# Patient Record
Sex: FEMALE | Race: WHITE | NOT HISPANIC OR LATINO
[De-identification: names, ages, dates, MRNs, and addresses within clinical notes are randomized per-mention and may not be internally consistent; named-entity substitution may affect disease eponyms.]

---
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
## 2020-12-07 PROBLEM — F41.1 GAD (GENERALIZED ANXIETY DISORDER): Status: ACTIVE | Noted: 2020-12-07

## 2020-12-07 PROBLEM — F50.01 ANOREXIA NERVOSA, RESTRICTING TYPE: Status: ACTIVE | Noted: 2020-12-07

## 2020-12-07 PROBLEM — F50.01 ANOREXIA NERVOSA, RESTRICTING TYPE (HCC): Status: ACTIVE | Noted: 2020-12-07

## 2020-12-07 PROBLEM — F50.019 ANOREXIA NERVOSA, RESTRICTING TYPE: Status: ACTIVE | Noted: 2020-12-07

## 2023-09-26 ENCOUNTER — HOSPITAL ENCOUNTER (OUTPATIENT)
Dept: MRI IMAGING | Age: 23
Discharge: HOME | End: 2023-09-26
Attending: NURSE PRACTITIONER

## 2023-09-26 ENCOUNTER — APPOINTMENT (OUTPATIENT)
Dept: MRI IMAGING | Age: 23
End: 2023-09-26
Attending: NURSE PRACTITIONER

## 2023-09-26 DIAGNOSIS — R51.9 HEADACHE: Primary | ICD-10-CM

## 2023-09-26 DIAGNOSIS — R20.2 PARESTHESIA: ICD-10-CM

## 2023-09-26 DIAGNOSIS — R51.9 HEADACHE: ICD-10-CM

## 2023-09-26 PROCEDURE — 10002805 HB CONTRAST AGENT: Performed by: NURSE PRACTITIONER

## 2023-09-26 PROCEDURE — A9585 GADOBUTROL INJECTION: HCPCS | Performed by: NURSE PRACTITIONER

## 2023-09-26 PROCEDURE — G1004 CDSM NDSC: HCPCS

## 2023-09-26 PROCEDURE — 70553 MRI BRAIN STEM W/O & W/DYE: CPT

## 2023-09-26 RX ORDER — GADOBUTROL 604.72 MG/ML
6 INJECTION INTRAVENOUS ONCE
Status: COMPLETED | OUTPATIENT
Start: 2023-09-26 | End: 2023-09-26

## 2023-09-26 RX ADMIN — GADOBUTROL 6 ML: 604.72 INJECTION INTRAVENOUS at 18:09

## 2024-01-16 ENCOUNTER — LAB REQUISITION (OUTPATIENT)
Dept: LAB | Age: 24
End: 2024-01-16

## 2024-01-16 DIAGNOSIS — Z01.419 ENCOUNTER FOR GYNECOLOGICAL EXAMINATION (GENERAL) (ROUTINE) WITHOUT ABNORMAL FINDINGS: ICD-10-CM

## 2024-01-16 PROCEDURE — 87591 N.GONORRHOEAE DNA AMP PROB: CPT | Performed by: CLINICAL MEDICAL LABORATORY

## 2024-01-16 PROCEDURE — 88175 CYTOPATH C/V AUTO FLUID REDO: CPT | Performed by: CLINICAL MEDICAL LABORATORY

## 2024-01-16 PROCEDURE — 87491 CHLMYD TRACH DNA AMP PROBE: CPT | Performed by: CLINICAL MEDICAL LABORATORY

## 2024-01-17 LAB
C TRACH RRNA SPEC QL NAA+PROBE: NEGATIVE
Lab: NORMAL
N GONORRHOEA RRNA SPEC QL NAA+PROBE: NEGATIVE

## 2024-01-18 LAB — HOLD SPECIMEN: NORMAL

## 2024-01-20 LAB
CASE RPRT: NORMAL
CLINICAL INFO: NORMAL
CYTOLOGY CVX/VAG STUDY: NORMAL
PAP EDUCATIONAL NOTE: NORMAL
SPECIMEN ADEQUACY: NORMAL

## 2024-07-17 ENCOUNTER — OFFICE VISIT (OUTPATIENT)
Dept: ENDOCRINOLOGY CLINIC | Facility: CLINIC | Age: 24
End: 2024-07-17

## 2024-07-17 ENCOUNTER — TELEPHONE (OUTPATIENT)
Dept: ENDOCRINOLOGY CLINIC | Facility: CLINIC | Age: 24
End: 2024-07-17

## 2024-07-17 VITALS
HEART RATE: 71 BPM | SYSTOLIC BLOOD PRESSURE: 90 MMHG | DIASTOLIC BLOOD PRESSURE: 52 MMHG | BODY MASS INDEX: 17.86 KG/M2 | HEIGHT: 64 IN | WEIGHT: 104.63 LBS

## 2024-07-17 DIAGNOSIS — M85.80 OSTEOPENIA, UNSPECIFIED LOCATION: Primary | ICD-10-CM

## 2024-07-17 NOTE — TELEPHONE ENCOUNTER
Received fax from [facility] internal medicine attached is pt medical records, pt has upcoming appt 07/17/24 records placed in provider folder for review

## 2024-07-17 NOTE — H&P
New Patient Evaluation - History and Physical    CONSULT - Reason for Visit:  Decreased BMD.  Requesting Physician: Dr. Natalia Vazquez.    CHIEF COMPLAINT:    Chief Complaint   Patient presents with    Consult     Osteopenia, last labs 7/2024        HISTORY OF PRESENT ILLNESS:   Huma Land is a 24 year old female who presents with decreased BMD.     Eating disorder since she was 13. This worsened in college.     FRACTURE HISTORY  o Low trauma, atraumatic or high trauma (specifics regarding circumstances of fracture)  None  o Fall-related fracture and circumstances of fall    o Prior history of fracture(s)     o Site, age at fracture, interventions    o Prior history of osteoporosis    o Prior history of osteoporosis treatment (medication, age or date used, duration of use, pre or postmenopausal ,when used, and reason for discontinuation) None    o Prolonged history of steroids, aromatase inhibitors, anticonvulsants, and other meds that may affect skeleton    o Chronic use vs. intermittent use with dates of usage    o Secondary conditions associated with osteoporosis    o DXA tests in past ( age or date when performed and results)   Reviewed    REPRODUCTIVE HISTORY      o Menarche age     o Regular/Irregular menses   Regular cycles, did not require birth control pills    PAST MEDICAL HISTORY:   No past medical history on file.      SOCIAL HISTORY    o Occupation     o Country of origin     o Tobacco    o Alcohol    o Daily calcium intake (food and supplements)  She takes vitamin D and calcium supplements  o Daily exercise  She exercises well  FAMILY HISTORY   Family History   Problem Relation Age of Onset    High Cholesterol Father     High Blood Pressure Father     Mental Disorder Father         anxiety    Anxiety Father         Takes Effexor and xanax PRN    Depression Father     Cancer Maternal Grandmother     High Cholesterol Maternal Grandmother     High Blood Pressure Maternal Grandfather     High  Cholesterol Maternal Grandfather     Other (hypothyroidism) Mother     Alcohol and Other Disorders Associated Neg     Substance Abuse Neg      Family history of fractures, osteoporosis, osteopenia      CURRENT MEDICATIONS:    Current Outpatient Medications   Medication Sig Dispense Refill    desvenlafaxine ER (PRISTIQ) 100 MG Oral Tablet 24 Hr Take 1 tablet (100 mg total) by mouth daily with breakfast. PLEASE CALL OFFICE FOR APPT. No future refills until appt is made 30 tablet 0    clonazePAM 0.5 MG Oral Tab Take a half tablet twice daily as needed for anxiety 45 tablet 2    hydrOXYzine 10 MG Oral Tab Take one to two tablets three times daily as needed. 60 tablet 0       ALLERGIES:  Allergies   Allergen Reactions    Amoclan NAUSEA AND VOMITING    Augmentin [Amoxicillin-Pot Clavulanate] NAUSEA AND VOMITING         ASSESSMENTS:   REVIEW OF SYSTEMS:  No falls in past 12 months, no loss of height   Constitutional: Negative for: weight change, fever, fatigue, cold/heat intolerance  Eyes: Negative for:  Visual changes, proptosis, blurring  ENT: Negative for:  dysphagia, neck swelling, dysphonia  Respiratory: Negative for:  dyspnea, cough  Cardiovascular: Negative for:  chest pain, palpitations, orthopnea  GI: Negative for:  abdominal pain, nausea, vomiting, diarrhea, constipation, bleeding  Neurology: Negative for: headache, numbness, weakness  Genito-Urinary: Negative for: dysuria, frequency  Psychiatric: Negative for:  depression, anxiety  Hematology/Lymphatics: Negative for: bruising, lower extremity edema  Endocrine: Negative for: polyuria, polydypsia  Skin: Negative for: rash, blister, cellulitis,      PHYSICAL EXAM:   Vitals:    07/17/24 1716   BP: 90/52   Pulse: 71   Weight: 104 lb 9.6 oz (47.4 kg)   Height: 5' 4\" (1.626 m)     BMI: Body mass index is 17.95 kg/m².     CONSTITUTIONAL:  awake, alert, cooperative, no apparent distress, and appears stated age  PSYCH: normal affect  SKIN:  no bruising or bleeding, no  rashes and no lesions  EXTREMITIES:normal pulses, no edema      DATA:   CMP:    Lab Results   Component Value Date     07/27/2018    K 3.8 07/27/2018     07/27/2018    CO2 29.8 07/27/2018    BUN 6 (L) 07/27/2018    GLU 91 07/27/2018    ALB 4.7 07/27/2018    CA 9.4 07/27/2018     FLP (Lipid Profile):  No results found for: \"CHOL\", \"TRIG\", \"HDL\"  LDL direct : No components found for: \"LDLDIRECT\"  Microalbumin/Creatinine ratio:  No components found for: \"RUCREAT\", \"RUMICROAL\", \"MCRATIO\"  TSH:    Lab Results   Component Value Date    TSH 2.457 08/09/2017         ASSESSMENT AND PLAN: This is a 24 year-old woman here for evaluation and management of the possibility of decreased bone mineral density due to nutrition and activity mismatch. It is also quite possible that we are seeing that she has not achieved peak bone mineral density due to the fact that she is only 24. I have explained to her that it would be a good idea for her to improve calcium intake through diet rather than supplements. She will also increase weight-bearing exercises.     She will return in 6 months with repeat DXA scan    Prior to this encounter, I spent over 20 minutes with preparing for the visit, reviewing documents from other specialties as well as from PCP, and going over test results and imaging studies. During the face to face encounter, I spent an additional 30 minutes which were determined for history-taking, physical examination, and orientation regarding our services. Greater than 50% of the time was spent in counseling, anticipatory guidance, and coordination of care. Patient concerns were answered to the best of my knowledge.     7/17/2024  Kelley Patiño MD

## 2024-07-21 PROBLEM — M85.80 OSTEOPENIA: Status: ACTIVE | Noted: 2024-07-21

## 2024-11-15 ENCOUNTER — TELEPHONE (OUTPATIENT)
Dept: ENDOCRINOLOGY CLINIC | Facility: CLINIC | Age: 24
End: 2024-11-15

## 2024-11-15 DIAGNOSIS — M85.80 OSTEOPENIA, UNSPECIFIED LOCATION: Primary | ICD-10-CM

## 2024-11-19 NOTE — TELEPHONE ENCOUNTER
Per LOV on 7/17:   She will return in 6 months with repeat DXA scan     No dexa order was placed.     RN placed order as written.     Left voice message for pt to call back. Sent [facility].

## 2025-01-14 ENCOUNTER — HOSPITAL ENCOUNTER (OUTPATIENT)
Dept: BONE DENSITY | Age: 25
Discharge: HOME | End: 2025-01-14
Attending: INTERNAL MEDICINE
Payer: COMMERCIAL

## 2025-01-14 DIAGNOSIS — M85.80 OSTEOPENIA, UNSPECIFIED LOCATION: ICD-10-CM

## 2025-01-14 PROCEDURE — 77080 DXA BONE DENSITY AXIAL: CPT | Performed by: INTERNAL MEDICINE

## 2025-01-22 ENCOUNTER — OFFICE VISIT (OUTPATIENT)
Dept: ENDOCRINOLOGY CLINIC | Facility: CLINIC | Age: 25
End: 2025-01-22

## 2025-01-22 VITALS
HEART RATE: 55 BPM | DIASTOLIC BLOOD PRESSURE: 51 MMHG | WEIGHT: 106.38 LBS | BODY MASS INDEX: 18.16 KG/M2 | HEIGHT: 64 IN | SYSTOLIC BLOOD PRESSURE: 90 MMHG

## 2025-01-22 DIAGNOSIS — E55.9 VITAMIN D DEFICIENCY: Primary | ICD-10-CM

## 2025-01-22 PROCEDURE — 99214 OFFICE O/P EST MOD 30 MIN: CPT | Performed by: INTERNAL MEDICINE

## 2025-01-22 PROCEDURE — 3008F BODY MASS INDEX DOCD: CPT | Performed by: INTERNAL MEDICINE

## 2025-01-22 PROCEDURE — 3078F DIAST BP <80 MM HG: CPT | Performed by: INTERNAL MEDICINE

## 2025-01-22 PROCEDURE — 3074F SYST BP LT 130 MM HG: CPT | Performed by: INTERNAL MEDICINE

## 2025-01-22 NOTE — PROGRESS NOTES
Follow-up - Reason for Visit:  Decreased BMD.  Requesting Physician: Dr. Natalia Vazquez.    CHIEF COMPLAINT:    Chief Complaint   Patient presents with    Follow - Up     Osteopenia, discuss dexa and lab results         HISTORY OF PRESENT ILLNESS:   Huma Land is a 24 year old female who presents for follow-up of BMD. We had concluded that this was either due to nutrition and activity mismatch or normal, given patient's age.     Eating disorder since she was 13. This worsened in college.     She is here to discuss repeat DXA scan.    FRACTURE HISTORY  o Low trauma, atraumatic or high trauma (specifics regarding circumstances of fracture)  None  o Fall-related fracture and circumstances of fall    o Prior history of fracture(s)     o Site, age at fracture, interventions    o Prior history of osteoporosis    o Prior history of osteoporosis treatment (medication, age or date used, duration of use, pre or postmenopausal ,when used, and reason for discontinuation) None    o Prolonged history of steroids, aromatase inhibitors, anticonvulsants, and other meds that may affect skeleton    o Chronic use vs. intermittent use with dates of usage    o Secondary conditions associated with osteoporosis    o DXA tests in past ( age or date when performed and results)   PROCEDURE: XR DEXA BONE DENSITOMETRY (CPT=77080)     COMPARISON: None.     INDICATIONS: M85.80 Osteopenia, unspecified location     TECHNIQUE: Measurement of bone mineral density of the lumbar spine and hip was performed on a Hologic dual energy x-ray absorptiometry scanner.     FINDINGS:     LEFT FEMORAL NECK  BMD: 0.828 gm/sq. cm. T SCORE: -0.2 Z SCORE: -0.2     LEFT TOTAL HIP  BMD: 0.946 gm/sq. cm. T SCORE: 0.0 Z SCORE: 0.0     PA LUMBAR SPINE (L1 - L4)  BMD: 0.981 gm/sq. cm. T SCORE: -0.6 Z SCORE: -0.6        T scores are a comparison to sex-matched patients with mean peak bone mass and are given in standard deviation (s.d.).  Each 1 s.d. corresponds to  approximately 10% below peak normal bone density.       WORLD HEALTH ORGANIZATION CRITERIA  NORMAL T SCORE: Above -1 s.d.    OSTEOPENIA T SCORE: Between -1 and -2.5 s.d.    OSTEOPOROSIS T SCORE: -2.5 s.d.     National Osteoporosis Foundation Clinician's Guide to Prevention and Treatment of Osteoporosis recommendations for treatment:  Post menopausal women and men age 50 and older presenting with the following should be considered for treatment:  * A hip or vertebral (clinical or morphometric) fracture  * T score < -2.5 at the femoral neck or spine after appropriate evaluation to exclude secondary causes.  * Low bone mass (T score between -1.0 and -2.5 at the femoral neck or spine) and a 10-year probability of a hip fracture > 3% or a 10-year probability of a major osteoporosis-related fracture > 20% based on the [place]-adapted WHO algorithm               Impression   CONCLUSION:  1. Scans of the lumbar spine and left hip are within normal range, which according to World Health Organization criteria places the patient at no increased risk for fracture.     2. Based on left femoral neck bone mineral density, the FRAX 10 year probability of a major osteoporotic fracture is 1.4% and the 10 year probability of a hip fracture is less than 0.1%.           Dictated by (CST): Ben Evans MD on 1/16/2025 at 7:17 AM      Finalized by (CST): Ben Evans MD on 1/16/2025 at 7:18 AM         REPRODUCTIVE HISTORY      o Menarche age     o Regular/Irregular menses   Regular cycles, did not require birth control pills    PAST MEDICAL HISTORY:   No past medical history on file.      SOCIAL HISTORY    o Occupation     o Country of origin     o Tobacco    o Alcohol    o Daily calcium intake (food and supplements)  She takes vitamin D and calcium supplements  o Daily exercise  She exercises well  FAMILY HISTORY   Family History   Problem Relation Age of Onset    High Cholesterol Father     High Blood Pressure Father     Mental  Disorder Father         anxiety    Anxiety Father         Takes Effexor and xanax PRN    Depression Father     Cancer Maternal Grandmother     High Cholesterol Maternal Grandmother     High Blood Pressure Maternal Grandfather     High Cholesterol Maternal Grandfather     Other (hypothyroidism) Mother     Alcohol and Other Disorders Associated Neg     Substance Abuse Neg      Family history of fractures, osteoporosis, osteopenia      CURRENT MEDICATIONS:    Current Outpatient Medications   Medication Sig Dispense Refill    desvenlafaxine ER (PRISTIQ) 100 MG Oral Tablet 24 Hr Take 1 tablet (100 mg total) by mouth daily with breakfast. PLEASE CALL OFFICE FOR APPT. No future refills until appt is made 30 tablet 0    clonazePAM 0.5 MG Oral Tab Take a half tablet twice daily as needed for anxiety 45 tablet 2    hydrOXYzine 10 MG Oral Tab Take one to two tablets three times daily as needed. 60 tablet 0       ALLERGIES:  Allergies   Allergen Reactions    Amoclan NAUSEA AND VOMITING    Augmentin [Amoxicillin-Pot Clavulanate] NAUSEA AND VOMITING         ASSESSMENTS:   REVIEW OF SYSTEMS:  No falls in past 12 months, no loss of height   Constitutional: Negative for: weight change, fever, fatigue, cold/heat intolerance  Eyes: Negative for:  Visual changes, proptosis, blurring  ENT: Negative for:  dysphagia, neck swelling, dysphonia  Respiratory: Negative for:  dyspnea, cough  Cardiovascular: Negative for:  chest pain, palpitations, orthopnea  GI: Negative for:  abdominal pain, nausea, vomiting, diarrhea, constipation, bleeding  Neurology: Negative for: headache, numbness, weakness  Genito-Urinary: Negative for: dysuria, frequency  Psychiatric: Negative for:  depression, anxiety  Hematology/Lymphatics: Negative for: bruising, lower extremity edema  Endocrine: Negative for: polyuria, polydypsia  Skin: Negative for: rash, blister, cellulitis,      PHYSICAL EXAM:   Vitals:    01/22/25 1700   BP: 90/51   Pulse: 55   Weight: 106 lb  6.4 oz (48.3 kg)   Height: 5' 4\" (1.626 m)       BMI: Body mass index is 18.26 kg/m².     CONSTITUTIONAL:  awake, alert, cooperative, no apparent distress, and appears stated age  PSYCH: normal affect  SKIN:  no bruising or bleeding, no rashes and no lesions  EXTREMITIES:normal pulses, no edema      DATA:   CMP:    Lab Results   Component Value Date     07/27/2018    K 3.8 07/27/2018     07/27/2018    CO2 29.8 07/27/2018    BUN 6 (L) 07/27/2018    GLU 91 07/27/2018    ALB 4.7 07/27/2018    CA 9.4 07/27/2018     FLP (Lipid Profile):  No results found for: \"CHOL\", \"TRIG\", \"HDL\"  LDL direct : No components found for: \"LDLDIRECT\"  Microalbumin/Creatinine ratio:  No components found for: \"RUCREAT\", \"RUMICROAL\", \"MCRATIO\"  TSH:    Lab Results   Component Value Date    TSH 2.457 08/09/2017         ASSESSMENT AND PLAN: This is a 24 year-old woman here for follow-up of management of the possibility of decreased bone mineral density due to nutrition and activity mismatch. It is also quite possible that we are seeing that she has not achieved peak bone mineral density due to the fact that she is only 24. I have explained to her that it would be a good idea for her to improve calcium intake through diet rather than supplements. She will also increase weight-bearing exercises.     Prior to this encounter, I spent over 15 minutes with preparing for the visit, including reviewing documents from other specialties as well as from PCP and going over test results and imaging studies. During the face to face encounter, I spent an additional 15 minutes which were determined for follow-up. Greater than 50% of the time was spent in counseling, anticipatory guidance, and coordination of care. Patient concerns were answered to the best of my knowledge.       1/22/25  Kelley Patiño MD

## 2025-01-26 PROBLEM — E55.9 VITAMIN D DEFICIENCY: Status: ACTIVE | Noted: 2025-01-26

## 2025-02-03 PROCEDURE — 87661 TRICHOMONAS VAGINALIS AMPLIF: CPT | Performed by: CLINICAL MEDICAL LABORATORY

## 2025-02-03 PROCEDURE — 87591 N.GONORRHOEAE DNA AMP PROB: CPT | Performed by: CLINICAL MEDICAL LABORATORY

## 2025-02-03 PROCEDURE — 87491 CHLMYD TRACH DNA AMP PROBE: CPT | Performed by: CLINICAL MEDICAL LABORATORY

## 2025-02-04 ENCOUNTER — LAB REQUISITION (OUTPATIENT)
Dept: LAB | Age: 25
End: 2025-02-04

## 2025-02-04 DIAGNOSIS — Z11.3 ENCOUNTER FOR SCREENING FOR INFECTIONS WITH A PREDOMINANTLY SEXUAL MODE OF TRANSMISSION: ICD-10-CM

## 2025-02-04 LAB
C TRACH RRNA SPEC QL NAA+PROBE: NEGATIVE
Lab: NORMAL
N GONORRHOEA RRNA SPEC QL NAA+PROBE: NEGATIVE
T VAGINALIS RRNA SPEC QL NAA+PROBE: NEGATIVE

## 2025-05-08 ENCOUNTER — TELEPHONE (OUTPATIENT)
Dept: ENDOCRINOLOGY CLINIC | Facility: CLINIC | Age: 25
End: 2025-05-08

## 2025-05-08 NOTE — TELEPHONE ENCOUNTER
Patient requesting orders for bone   CHANA [place] bone test, please update at [phone],thanks.

## 2025-05-12 NOTE — TELEPHONE ENCOUNTER
Dr. Patiño --    Pt is requesting orders for TBS (trabecular bone score) and trabecular bone test?

## 2025-05-12 NOTE — TELEPHONE ENCOUNTER
Unfortunately I do not think we have that capability on our DEXA testing at [facility].  She can certainly double check with Dr. Patiño when she is back in the office next week. Thanks.

## 2025-05-14 ENCOUNTER — TELEPHONE (OUTPATIENT)
Dept: ENDOCRINOLOGY CLINIC | Facility: CLINIC | Age: 25
End: 2025-05-14

## 2025-05-14 NOTE — TELEPHONE ENCOUNTER
Discussed with on call endocrinologist Dr. Charles. OK to schedule sooner visit with him to discuss symptoms or add to wait list for Dr. Patiño.     Patient accepted appt with Dr. Charles.

## 2025-05-14 NOTE — TELEPHONE ENCOUNTER
Patient continues to lose weight and is requesting lab work. Please advise if these can be ordered or if you would like to refer to treating PCP?     Called the patient. Verified name and date of birth.     Patient states she has a history of anorexia and has been struggling with eating. She is following with Dietician Brittaney Colvin (P: [phone]) and PCP Jaylene Bunn internal [facility] in [place] (p: [phone]) for treatment of this condition.     She was maintained on Rexulti, an antipsycotic for depression, last year and lost weight while on the medication. Started at 109-109 lbs and lost to 105-106 lbs. Therefore she stopped the medication on Thanksgiving day 2024. Despite stopping the medication she continues to slowly lose weight. She admits she may still have been undereating a little but not exercising more. Has been eating more over the past few weeks. Yesterday she called her PCP and dietician with concerns that her weight has dropped to 99 lbs and she is worried. Denies any feelings of depression.     Her dietician recommended that she ask for some blood work from her endocrinologist or PCP. She has requested from her PCP but has not received back yet.     Requesting the following:  SINAN  Free T3  Free T4  Thyroid antibodies  ESR sed rate    Follow up is scheduled with Dr. Patiño on 7/28/25.

## 2025-05-14 NOTE — TELEPHONE ENCOUNTER
Patient states that she has lost 6 lbs and thinks it is due to rexulti.  Patient would like to know if labs can be done.  Please call.

## 2025-05-16 ENCOUNTER — TELEMEDICINE (OUTPATIENT)
Facility: CLINIC | Age: 25
End: 2025-05-16
Payer: COMMERCIAL

## 2025-05-16 ENCOUNTER — TELEPHONE (OUTPATIENT)
Facility: LOCATION | Age: 25
End: 2025-05-16

## 2025-05-16 DIAGNOSIS — E06.3 HASHIMOTO THYROIDITIS: ICD-10-CM

## 2025-05-16 DIAGNOSIS — E07.9 THYROID DISEASE: Primary | ICD-10-CM

## 2025-05-16 DIAGNOSIS — R63.4 WEIGHT LOSS: ICD-10-CM

## 2025-05-16 PROCEDURE — 98006 SYNCH AUDIO-VIDEO EST MOD 30: CPT | Performed by: INTERNAL MEDICINE

## 2025-05-16 NOTE — PROGRESS NOTES
Follow-up - Reason for Visit: weight loss   .  Requesting Physician: Dr. Natalia Vazquez.    CHIEF COMPLAINT:    No chief complaint on file.       HISTORY OF PRESENT ILLNESS:   Huma Land is a 25 year old female who was added to schedule urgently . She called the office she asked to be assess for wt loss    She is new to me. Last viist was on 1/22/25 with Dr Kelley Patiño MD  Wt loss since 2020  She was 96 lbs for 2 years.   Summer 2022, she got treatment Rexulti (Brexpiprazole) ) and gained wt  She relapsed and started not eating. Lost wt to 105lb  11/2024, was prep for exam. She was worried about Rexulti (Brexpiprazole) causing cognitive issue. Stopped it 11/2024    She is 109-->103 on recent doctor visit.  She is working out and eating more now.   She called her doctor who recommend getting more w/u     Labs showed low b12 and normal vit D per patient   She has history of anemia and getting iron infusion every 6 mo. Seeing hematology     Last period was 2 weeks ago, regular  No tremors   Mother has hypothyroidism   Previous labs showed      06/29/16 14:09   THYROGLOBULIN ANTIBODY 0.0 - 0.9 IU/mL 5.1 (H)   THYROID PEROXIDASE (TPO) AB 0 - 26 IU/mL 11      Now she is on b12, m vitamins, trazadone, vilazodone), a selective serotonin reuptake inhibitor (SSRI) antidepressant used to treat major depressive disorder (MDD)          PAST MEDICAL HISTORY:   No past medical history on file.  Eating disorder since she was 13. This worsened in college.        SOCIAL HISTORY     FAMILY HISTORY   Family History   Problem Relation Age of Onset    High Cholesterol Father     High Blood Pressure Father     Mental Disorder Father         anxiety    Anxiety Father         Takes Effexor and xanax PRN    Depression Father     Cancer Maternal Grandmother     High Cholesterol Maternal Grandmother     High Blood Pressure Maternal Grandfather     High Cholesterol Maternal Grandfather     Other (hypothyroidism) Mother      Alcohol and Other Disorders Associated Neg     Substance Abuse Neg      Family history of fractures, osteoporosis, osteopenia      CURRENT MEDICATIONS:    Current Outpatient Medications   Medication Sig Dispense Refill    desvenlafaxine ER (PRISTIQ) 100 MG Oral Tablet 24 Hr Take 1 tablet (100 mg total) by mouth daily with breakfast. PLEASE CALL OFFICE FOR APPT. No future refills until appt is made 30 tablet 0    clonazePAM 0.5 MG Oral Tab Take a half tablet twice daily as needed for anxiety 45 tablet 2    hydrOXYzine 10 MG Oral Tab Take one to two tablets three times daily as needed. 60 tablet 0       ALLERGIES:  Allergies   Allergen Reactions    Amoclan NAUSEA AND VOMITING    Augmentin [Amoxicillin-Pot Clavulanate] NAUSEA AND VOMITING              PHYSICAL EXAM:   There were no vitals filed for this visit.      BMI: There is no height or weight on file to calculate BMI.          DATA:   CMP:    Lab Results   Component Value Date     07/27/2018    K 3.8 07/27/2018     07/27/2018    CO2 29.8 07/27/2018    BUN 6 (L) 07/27/2018    GLU 91 07/27/2018    ALB 4.7 07/27/2018    CA 9.4 07/27/2018     FLP (Lipid Profile):  No results found for: \"CHOL\", \"TRIG\", \"HDL\"  LDL direct : No components found for: \"LDLDIRECT\"  Microalbumin/Creatinine ratio:  No components found for: \"RUCREAT\", \"RUMICROAL\", \"MCRATIO\"  TSH:    Lab Results   Component Value Date    TSH 2.457 08/09/2017         ASSESSMENT AND PLAN:     25 year old female was seen for weight loss  Labs showed high hashimoto's antibodies. We discussed her diagnosis. She understands the need to monitor thyroid labs.      Labs today   Thyroid labs annually or sooner if there is change in clinical status/pregnancy   Follow up with Dr Patiño       Orders Placed This Encounter    TSH and Free T4    Free T3 (Triiodothryronine)    Thyroid peroxidase & thyroglobulin ab    Celiac Disease Screen Reflex     5/16/2025  Erin Charles MD

## 2025-05-16 NOTE — PATIENT INSTRUCTIONS
Labs today   Thyroid labs annually or sooner if there is change in clinical status/pregnancy   Follow up with Dr Patiño            06/29/16 14:09   THYROGLOBULIN ANTIBODY 0.0 - 0.9 IU/mL 5.1 (H)   THYROID PEROXIDASE (TPO) AB 0 - 26 IU/mL 11

## 2025-05-19 NOTE — TELEPHONE ENCOUNTER
Please ask the pt to discuss those test next visit in July. Might not be covered by insurance if no indication for them now   Thanks

## 2025-05-19 NOTE — TELEPHONE ENCOUNTER
Dr. Charles --  Pt is requesting orders for TBS (trabecular bone score) and trabecular bone test?

## 2025-05-27 ENCOUNTER — TELEPHONE (OUTPATIENT)
Dept: ENDOCRINOLOGY CLINIC | Facility: CLINIC | Age: 25
End: 2025-05-27

## 2025-05-27 DIAGNOSIS — M85.80 OSTEOPENIA, UNSPECIFIED LOCATION: Primary | ICD-10-CM

## 2025-05-27 NOTE — TELEPHONE ENCOUNTER
Hi!    Please let patient know that we do not perform this test here. It is possible that she can get this test completed at [facility], [facility], or [facility]. Is there any particular reason that she would like to have this done?    Thanks.

## 2025-05-27 NOTE — TELEPHONE ENCOUNTER
Patient is calling to follow up on getting order to get Dexa Scan test done and the Trabecular bone scoring test done?

## 2025-05-27 NOTE — TELEPHONE ENCOUNTER
Lvm for pt to call back re: orders request. Dr. Patiño can you clarify, if we can order these test, pt is requesting? Thanks     TBS (trabecular bone score) and trabecular bone test?

## 2025-05-29 ENCOUNTER — PATIENT MESSAGE (OUTPATIENT)
Dept: ENDOCRINOLOGY CLINIC | Facility: CLINIC | Age: 25
End: 2025-05-29

## 2025-05-29 NOTE — TELEPHONE ENCOUNTER
Dr. Patiño,  Patient asking for order for regular dexa scan per  message:  As I told the  several times yesterday, the trabecular was a possible add-on I was wondering about. If not available if necessary, that’s fine. What I DO need is a DEXA scan order as July will jose alejandro six months since I had my last one done.     Please advise -thanks

## 2025-05-30 ENCOUNTER — TELEPHONE (OUTPATIENT)
Dept: ENDOCRINOLOGY CLINIC | Facility: CLINIC | Age: 25
End: 2025-05-30

## 2025-05-30 NOTE — TELEPHONE ENCOUNTER
Hi!    Please let patient know that in 6 months there will not be any significant change that we will be able to see. It is better to wait a year from previous. I will place the order, but please let her know. Thank you!

## 2025-05-31 NOTE — TELEPHONE ENCOUNTER
Hi!    Please let patient know that she does not have Hashimoto's Disease. We only give this designation to patients who have hypothyroidism and positive anti-TPO antibodies.     When patient's have normal thyroid function, we do not even check antibodies.     An elevated ESR is a sign of inflammation. That is correct, but this is not my field, and therefore, I will not be able to comment upon its significance.     Thank you!  
 Lupe helping with h&p.

## 2025-06-26 ENCOUNTER — HOSPITAL ENCOUNTER (OUTPATIENT)
Dept: BONE DENSITY | Age: 25
Discharge: HOME | End: 2025-06-26
Attending: INTERNAL MEDICINE
Payer: COMMERCIAL

## 2025-06-26 DIAGNOSIS — M85.80 OSTEOPENIA, UNSPECIFIED LOCATION: ICD-10-CM

## 2025-06-26 PROCEDURE — 77080 DXA BONE DENSITY AXIAL: CPT | Performed by: INTERNAL MEDICINE
